# Patient Record
Sex: MALE | Race: WHITE | NOT HISPANIC OR LATINO | Employment: OTHER | URBAN - METROPOLITAN AREA
[De-identification: names, ages, dates, MRNs, and addresses within clinical notes are randomized per-mention and may not be internally consistent; named-entity substitution may affect disease eponyms.]

---

## 2019-01-03 ENCOUNTER — OFFICE VISIT (OUTPATIENT)
Dept: RETAIL CLINIC | Facility: CLINIC | Age: 49
End: 2019-01-03

## 2019-01-03 VITALS
RESPIRATION RATE: 20 BRPM | TEMPERATURE: 99.3 F | HEIGHT: 70 IN | WEIGHT: 315 LBS | OXYGEN SATURATION: 96 % | DIASTOLIC BLOOD PRESSURE: 85 MMHG | BODY MASS INDEX: 45.1 KG/M2 | HEART RATE: 83 BPM | SYSTOLIC BLOOD PRESSURE: 124 MMHG

## 2019-01-03 DIAGNOSIS — J02.0 STREP PHARYNGITIS: Primary | ICD-10-CM

## 2019-01-03 LAB
EXPIRATION DATE: ABNORMAL
INTERNAL CONTROL: ABNORMAL
Lab: ABNORMAL
S PYO AG THROAT QL: POSITIVE

## 2019-01-03 PROCEDURE — 87880 STREP A ASSAY W/OPTIC: CPT | Performed by: NURSE PRACTITIONER

## 2019-01-03 PROCEDURE — 99213 OFFICE O/P EST LOW 20 MIN: CPT | Performed by: NURSE PRACTITIONER

## 2019-01-03 RX ORDER — PREDNISONE 10 MG/1
TABLET ORAL DAILY
Qty: 21 EACH | Refills: 0 | Status: SHIPPED | OUTPATIENT
Start: 2019-01-03 | End: 2019-01-09

## 2019-01-03 RX ORDER — VENLAFAXINE 75 MG/1
75 TABLET ORAL 3 TIMES DAILY
COMMUNITY
End: 2022-07-07

## 2019-01-03 RX ORDER — AMOXICILLIN 875 MG/1
875 TABLET, COATED ORAL 2 TIMES DAILY
Qty: 20 TABLET | Refills: 0 | Status: SHIPPED | OUTPATIENT
Start: 2019-01-03 | End: 2019-01-13

## 2019-01-03 RX ORDER — ECHINACEA PURPUREA EXTRACT 125 MG
1 TABLET ORAL AS NEEDED
Qty: 44 ML | Refills: 1 | Status: SHIPPED | OUTPATIENT
Start: 2019-01-03

## 2019-01-03 RX ORDER — GUAIFENESIN 600 MG/1
1200 TABLET, EXTENDED RELEASE ORAL 2 TIMES DAILY PRN
Qty: 40 TABLET | Refills: 0 | Status: SHIPPED | OUTPATIENT
Start: 2019-01-03 | End: 2019-01-13

## 2019-01-03 NOTE — PROGRESS NOTES
Debbie Reynolds is a 48 y.o. male.     Pt seen today for a sore thtoat for the last 3 days without fever.       Sore Throat    This is a new problem. The current episode started in the past 7 days. The problem has been gradually worsening. Neither side of throat is experiencing more pain than the other. There has been no fever. The pain is moderate. Associated symptoms include congestion, coughing, headaches, a hoarse voice and trouble swallowing. Pertinent negatives include no abdominal pain, diarrhea, drooling, ear discharge, ear pain, plugged ear sensation, neck pain, shortness of breath, stridor, swollen glands or vomiting. He has had no exposure to strep or mono. He has tried NSAIDs for the symptoms. The treatment provided no relief.        Current Outpatient Medications on File Prior to Visit   Medication Sig Dispense Refill   • Coal Tar Extract (T/GEL EXTRA STRENGTH EX) Apply  topically.     • darunavir (PREZISTA) 600 MG tablet Take 600 mg by mouth 2 (Two) Times a Day.     • Rswbfin-Auvjc-Spykqhug-TenofAF (GENVOYA) [phone] MG per tablet 1 tablet.     • venlafaxine (EFFEXOR) 75 MG tablet Take 75 mg by mouth 3 (Three) Times a Day.       No current facility-administered medications on file prior to visit.        Allergies   Allergen Reactions   • Atovaquone Rash   • Meloxicam Rash   • Sulfa Antibiotics Rash   • Tramadol Rash       Past Medical History:   Diagnosis Date   • Anxiety    • COPD (chronic obstructive pulmonary disease) (CMS/[facility])    • Depression    • Gallbladder abscess    • HIV (human immunodeficiency virus infection) (CMS/[facility]) 1997    for 22yrs       Past Surgical History:   Procedure Laterality Date   • CHOLECYSTECTOMY         Family History   Problem Relation Age of Onset   • Cancer Mother    • Heart disease Mother    • Lung disease Mother    • Cancer Father    • Heart disease Father    • Lung disease Father    • Lung disease Sister        Social History     Socioeconomic  "History   • Marital status: Single     Spouse name: Not on file   • Number of children: Not on file   • Years of education: Not on file   • Highest education level: Not on file   Social Needs   • Financial resource strain: Not on file   • Food insecurity - worry: Not on file   • Food insecurity - inability: Not on file   • Transportation needs - medical: Not on file   • Transportation needs - non-medical: Not on file   Occupational History   • Not on file   Tobacco Use   • Smoking status: Former Smoker     Types: Cigarettes     Last attempt to quit: 6/3/2012     Years since quittin.5   Substance and Sexual Activity   • Alcohol use: No     Frequency: Never   • Drug use: No   • Sexual activity: Defer   Other Topics Concern   • Not on file   Social History Narrative   • Not on file       Review of Systems   Constitutional: Positive for appetite change and fatigue. Negative for activity change, chills, diaphoresis and fever.   HENT: Positive for congestion, hoarse voice, sore throat, trouble swallowing and voice change. Negative for drooling, ear discharge and ear pain.    Eyes: Negative for pain, discharge, redness and itching.   Respiratory: Positive for cough and chest tightness. Negative for shortness of breath and stridor.    Gastrointestinal: Negative for abdominal pain, diarrhea, nausea and vomiting.   Musculoskeletal: Negative for arthralgias, myalgias and neck pain.   Skin: Negative for rash.   Allergic/Immunologic: Negative for environmental allergies.   Neurological: Positive for headaches. Negative for dizziness and light-headedness.   Psychiatric/Behavioral: Negative for agitation.       /85   Pulse 83   Temp 99.3 °F (37.4 °C) (Oral)   Resp 20   Ht 177.8 cm (70\")   Wt (!) 151 kg (332 lb)   SpO2 96%   BMI 47.64 kg/m²     Objective   Physical Exam   Constitutional: He is oriented to person, place, and time. He appears well-developed and well-nourished. He is cooperative. He appears ill. "   HENT:   Head: Normocephalic and atraumatic.   Right Ear: Tympanic membrane, external ear and ear canal normal.   Left Ear: Tympanic membrane, external ear and ear canal normal.   Nose: Right sinus exhibits maxillary sinus tenderness and frontal sinus tenderness. Left sinus exhibits no maxillary sinus tenderness and no frontal sinus tenderness.   Mouth/Throat: Uvula is midline and mucous membranes are normal. Posterior oropharyngeal erythema present. No oropharyngeal exudate or posterior oropharyngeal edema. Tonsils are 1+ on the right. Tonsils are 1+ on the left. No tonsillar exudate.   Eyes: Conjunctivae and lids are normal.   Cardiovascular: Normal heart sounds.   Pulmonary/Chest: Effort normal. He has wheezes in the left lower field.   Lymphadenopathy:     He has cervical adenopathy.   Neurological: He is alert and oriented to person, place, and time.   Skin: Skin is warm and dry. No rash noted.   Psychiatric: He has a normal mood and affect. His speech is normal and behavior is normal.       Procedures None    Assessment/Plan   Diagnoses and all orders for this visit:    Strep pharyngitis  -     POC Rapid Strep A    Other orders  -     PredniSONE (DELTASONE) 10 MG (21) tablet pack; Take  by mouth Daily for 6 days. Use as directed on package  -     amoxicillin (AMOXIL) 875 MG tablet; Take 1 tablet by mouth 2 (Two) Times a Day for 10 days.  -     guaiFENesin (MUCINEX) 600 MG 12 hr tablet; Take 2 tablets by mouth 2 (Two) Times a Day As Needed for Cough for up to 10 days.  -     sodium chloride (OCEAN NASAL SPRAY) 0.65 % nasal spray; 1 spray into the nostril(s) as directed by provider As Needed for Congestion.        --Please utilize tylenol or ibuprofen as needed for fever or pain. Use as recommended.   -Use mucinex or equivalent over the counter medicine for congestion.  -- Noted 8-10 eight ounce glasses of fluid a day. Nonalcoholic and noncafeinated beverages  -Pt given a list of PCP's to call and make an  appointment with .     Follow up with PCP or go to the nearest emergency room if symptoms worsen or fail to improve.

## 2020-10-22 ENCOUNTER — LAB (OUTPATIENT)
Dept: LAB | Facility: HOSPITAL | Age: 50
End: 2020-10-22

## 2020-10-22 ENCOUNTER — OFFICE VISIT (OUTPATIENT)
Dept: ENDOCRINOLOGY | Facility: CLINIC | Age: 50
End: 2020-10-22

## 2020-10-22 VITALS
OXYGEN SATURATION: 99 % | DIASTOLIC BLOOD PRESSURE: 82 MMHG | HEIGHT: 71 IN | WEIGHT: 315 LBS | HEART RATE: 85 BPM | TEMPERATURE: 98.4 F | SYSTOLIC BLOOD PRESSURE: 136 MMHG | BODY MASS INDEX: 44.1 KG/M2

## 2020-10-22 DIAGNOSIS — E29.1 PRIMARY MALE HYPOGONADISM: Primary | ICD-10-CM

## 2020-10-22 DIAGNOSIS — E29.1 PRIMARY MALE HYPOGONADISM: ICD-10-CM

## 2020-10-22 LAB
BASOPHILS # BLD AUTO: 0.05 10*3/MM3 (ref 0–0.2)
BASOPHILS NFR BLD AUTO: 0.6 % (ref 0–1.5)
DEPRECATED RDW RBC AUTO: 42.3 FL (ref 37–54)
EOSINOPHIL # BLD AUTO: 0.1 10*3/MM3 (ref 0–0.4)
EOSINOPHIL NFR BLD AUTO: 1.2 % (ref 0.3–6.2)
ERYTHROCYTE [DISTWIDTH] IN BLOOD BY AUTOMATED COUNT: 14.1 % (ref 12.3–15.4)
HCT VFR BLD AUTO: 46.3 % (ref 37.5–51)
HGB BLD-MCNC: 16 G/DL (ref 13–17.7)
IMM GRANULOCYTES # BLD AUTO: 0.11 10*3/MM3 (ref 0–0.05)
IMM GRANULOCYTES NFR BLD AUTO: 1.3 % (ref 0–0.5)
LYMPHOCYTES # BLD AUTO: 1.97 10*3/MM3 (ref 0.7–3.1)
LYMPHOCYTES NFR BLD AUTO: 24.1 % (ref 19.6–45.3)
MCH RBC QN AUTO: 29 PG (ref 26.6–33)
MCHC RBC AUTO-ENTMCNC: 34.6 G/DL (ref 31.5–35.7)
MCV RBC AUTO: 83.9 FL (ref 79–97)
MONOCYTES # BLD AUTO: 0.62 10*3/MM3 (ref 0.1–0.9)
MONOCYTES NFR BLD AUTO: 7.6 % (ref 5–12)
NEUTROPHILS NFR BLD AUTO: 5.33 10*3/MM3 (ref 1.7–7)
NEUTROPHILS NFR BLD AUTO: 65.2 % (ref 42.7–76)
NRBC BLD AUTO-RTO: 0 /100 WBC (ref 0–0.2)
PLATELET # BLD AUTO: 163 10*3/MM3 (ref 140–450)
PMV BLD AUTO: 11.1 FL (ref 6–12)
RBC # BLD AUTO: 5.52 10*6/MM3 (ref 4.14–5.8)
TESTOST SERPL-MCNC: 874 NG/DL (ref 249–836)
WBC # BLD AUTO: 8.18 10*3/MM3 (ref 3.4–10.8)

## 2020-10-22 PROCEDURE — 84403 ASSAY OF TOTAL TESTOSTERONE: CPT | Performed by: INTERNAL MEDICINE

## 2020-10-22 PROCEDURE — 36415 COLL VENOUS BLD VENIPUNCTURE: CPT

## 2020-10-22 PROCEDURE — 99213 OFFICE O/P EST LOW 20 MIN: CPT | Performed by: INTERNAL MEDICINE

## 2020-10-22 PROCEDURE — 85025 COMPLETE CBC W/AUTO DIFF WBC: CPT | Performed by: INTERNAL MEDICINE

## 2020-10-22 RX ORDER — ALBUTEROL SULFATE 90 UG/1
AEROSOL, METERED RESPIRATORY (INHALATION)
COMMUNITY
Start: 2020-09-27

## 2020-10-22 RX ORDER — PREGABALIN 150 MG/1
CAPSULE ORAL
COMMUNITY
Start: 2020-10-04

## 2020-10-22 RX ORDER — BUPRENORPHINE HYDROCHLORIDE AND NALOXONE HYDROCHLORIDE DIHYDRATE 8; 2 MG/1; MG/1
1 TABLET SUBLINGUAL DAILY
COMMUNITY

## 2020-10-22 RX ORDER — DESVENLAFAXINE 100 MG/1
TABLET, EXTENDED RELEASE ORAL
COMMUNITY
Start: 2020-09-17

## 2020-10-22 RX ORDER — TRETINOIN 1 MG/G
CREAM TOPICAL
COMMUNITY
Start: 2020-09-27

## 2020-10-22 RX ORDER — TESTOSTERONE CYPIONATE 200 MG/ML
1 INJECTION, SOLUTION INTRAMUSCULAR
COMMUNITY
Start: 2020-09-27 | End: 2020-12-31 | Stop reason: SDUPTHER

## 2020-10-22 RX ORDER — BETAMETHASONE DIPROPIONATE 0.5 MG/G
CREAM TOPICAL
COMMUNITY
Start: 2020-09-27

## 2020-10-22 RX ORDER — LAMOTRIGINE 200 MG/1
TABLET ORAL
COMMUNITY
Start: 2020-09-17

## 2020-10-22 NOTE — PROGRESS NOTES
"     Office Note      Date: 10/22/2020  Patient Name: Jimbo Reynolds  MRN: [id number]  : 1970    Chief Complaint   Patient presents with   • Follow-up   • Hypogonadism       History of Present Illness:   Jimbo Reynolds is a 50 y.o. male who presents for Follow-up and Hypogonadism  He has primary testes failure with high fsh and lh.   Context: hiv + and hx of drug addiction  Modifying factors- testosterone replacement therapy  Current symptoms: libido is good. Body hair is better, energy level is better. Body fat is better. Acne is worse.  Associated disorders: no issues with high blood pressure or erythrocytosis. No issues wih prostat.    Subjective          Review of Systems:   Review of Systems   Constitutional: Negative.    Eyes: Negative.    Endocrine: Negative.    Allergic/Immunologic: Positive for immunocompromised state. Negative for environmental allergies.   Neurological: Negative.        The following portions of the patient's history were reviewed and updated as appropriate: allergies, current medications, past family history, past medical history, past social history, past surgical history and problem list.    Objective     Visit Vitals  /82 (BP Location: Left arm, Patient Position: Sitting, Cuff Size: Large Adult)   Pulse 85   Temp 98.4 °F (36.9 °C)   Ht 180.3 cm (71\")   Wt (!) 149 kg (328 lb 6.4 oz)   SpO2 99%   BMI 45.80 kg/m²       Labs:    CBC w/DIFF  Lab Results   Component Value Date    WBC 8.18 10/22/2020    RBC 5.52 10/22/2020    HGB 16.0 10/22/2020    HCT 46.3 10/22/2020    MCV 83.9 10/22/2020    MCH 29.0 10/22/2020    MCHC 34.6 10/22/2020    RDW 14.1 10/22/2020    RDWSD 42.3 10/22/2020    MPV 11.1 10/22/2020     10/22/2020    NEUTRORELPCT 65.2 10/22/2020    LYMPHORELPCT 24.1 10/22/2020    MONORELPCT 7.6 10/22/2020    EOSRELPCT 1.2 10/22/2020    BASORELPCT 0.6 10/22/2020    AUTOIGPER 1.3 (H) 10/22/2020    NEUTROABS 5.33 10/22/2020    LYMPHSABS 1.97 10/22/2020    MONOSABS " "0.62 10/22/2020    EOSABS 0.10 10/22/2020    BASOSABS 0.05 10/22/2020    AUTOIGNUM 0.11 (H) 10/22/2020    NRBC 0.0 10/22/2020       T4  No results found for: FREET4    TSH  No results found for: TSHBASE     Physical Exam:  Physical Exam  Constitutional:       Appearance: Normal appearance. He is obese.   HENT:      Head: Normocephalic and atraumatic.   Cardiovascular:      Rate and Rhythm: Normal rate and regular rhythm.   Pulmonary:      Effort: Pulmonary effort is normal.      Breath sounds: Normal breath sounds.   Musculoskeletal: Normal range of motion.   Skin:     General: Skin is warm and dry.   Neurological:      General: No focal deficit present.      Mental Status: He is alert and oriented to person, place, and time.   Psychiatric:         Mood and Affect: Mood normal.         Thought Content: Thought content normal.         Judgment: Judgment normal.         Assessment / Plan      Assessment & Plan:  Problem List Items Addressed This Visit        Other    Primary male hypogonadism - Primary    Current Assessment & Plan     Clinically he is doing well.   Testosterone is a high risk  parenteral controlled substance that requires monitoring of levels and cbc.           Relevant Medications    Testosterone Cypionate (DEPOTESTOTERONE CYPIONATE) 200 MG/ML injection    Insulin Syringe-Needle U-100 (B-D INSULIN SYRINGE 1CC/25GX1\") 25G X 1\" 1 ML misc    Other Relevant Orders    Testosterone (Completed)    CBC & Differential (Completed)           Brooks Joseph MD   10/22/2020  "

## 2020-10-22 NOTE — ASSESSMENT & PLAN NOTE
Clinically he is doing well.   Testosterone is a high risk  parenteral controlled substance that requires monitoring of levels and cbc.

## 2020-12-31 ENCOUNTER — TELEPHONE (OUTPATIENT)
Dept: ENDOCRINOLOGY | Facility: CLINIC | Age: 50
End: 2020-12-31

## 2020-12-31 DIAGNOSIS — E29.1 PRIMARY MALE HYPOGONADISM: Primary | ICD-10-CM

## 2020-12-31 RX ORDER — SYRINGE AND NEEDLE,INSULIN,1ML 25GX1"
SYRINGE, EMPTY DISPOSABLE MISCELLANEOUS
Qty: 10 EACH | Refills: 3 | Status: SHIPPED | OUTPATIENT
Start: 2020-12-31 | End: 2022-07-18

## 2020-12-31 RX ORDER — TESTOSTERONE CYPIONATE 200 MG/ML
200 INJECTION, SOLUTION INTRAMUSCULAR
Qty: 10 ML | Refills: 1 | Status: SHIPPED | OUTPATIENT
Start: 2020-12-31 | End: 2021-06-15

## 2020-12-31 NOTE — TELEPHONE ENCOUNTER
PT CALLED STATING THAT HE NEEDS TESTOSTERONE AND INJECTION NEEDLES SENT IN TO [facility] [address].

## 2021-04-22 ENCOUNTER — LAB (OUTPATIENT)
Dept: LAB | Facility: HOSPITAL | Age: 51
End: 2021-04-22

## 2021-04-22 ENCOUNTER — OFFICE VISIT (OUTPATIENT)
Dept: ENDOCRINOLOGY | Facility: CLINIC | Age: 51
End: 2021-04-22

## 2021-04-22 VITALS
SYSTOLIC BLOOD PRESSURE: 124 MMHG | BODY MASS INDEX: 43.68 KG/M2 | DIASTOLIC BLOOD PRESSURE: 80 MMHG | OXYGEN SATURATION: 97 % | TEMPERATURE: 97.3 F | WEIGHT: 312 LBS | HEIGHT: 71 IN | HEART RATE: 94 BPM

## 2021-04-22 DIAGNOSIS — E29.1 PRIMARY MALE HYPOGONADISM: Primary | ICD-10-CM

## 2021-04-22 DIAGNOSIS — E29.1 PRIMARY MALE HYPOGONADISM: ICD-10-CM

## 2021-04-22 LAB
BASOPHILS # BLD AUTO: 0.04 10*3/MM3 (ref 0–0.2)
BASOPHILS NFR BLD AUTO: 0.6 % (ref 0–1.5)
DEPRECATED RDW RBC AUTO: 40.6 FL (ref 37–54)
EOSINOPHIL # BLD AUTO: 0.09 10*3/MM3 (ref 0–0.4)
EOSINOPHIL NFR BLD AUTO: 1.3 % (ref 0.3–6.2)
ERYTHROCYTE [DISTWIDTH] IN BLOOD BY AUTOMATED COUNT: 13.6 % (ref 12.3–15.4)
HCT VFR BLD AUTO: 48.9 % (ref 37.5–51)
HGB BLD-MCNC: 17.1 G/DL (ref 13–17.7)
IMM GRANULOCYTES # BLD AUTO: 0.09 10*3/MM3 (ref 0–0.05)
IMM GRANULOCYTES NFR BLD AUTO: 1.3 % (ref 0–0.5)
LYMPHOCYTES # BLD AUTO: 1.69 10*3/MM3 (ref 0.7–3.1)
LYMPHOCYTES NFR BLD AUTO: 24.4 % (ref 19.6–45.3)
MCH RBC QN AUTO: 29.1 PG (ref 26.6–33)
MCHC RBC AUTO-ENTMCNC: 35 G/DL (ref 31.5–35.7)
MCV RBC AUTO: 83.2 FL (ref 79–97)
MONOCYTES # BLD AUTO: 0.65 10*3/MM3 (ref 0.1–0.9)
MONOCYTES NFR BLD AUTO: 9.4 % (ref 5–12)
NEUTROPHILS NFR BLD AUTO: 4.36 10*3/MM3 (ref 1.7–7)
NEUTROPHILS NFR BLD AUTO: 63 % (ref 42.7–76)
NRBC BLD AUTO-RTO: 0 /100 WBC (ref 0–0.2)
PLATELET # BLD AUTO: 200 10*3/MM3 (ref 140–450)
PMV BLD AUTO: 11 FL (ref 6–12)
RBC # BLD AUTO: 5.88 10*6/MM3 (ref 4.14–5.8)
TESTOST SERPL-MCNC: 1022 NG/DL (ref 249–836)
WBC # BLD AUTO: 6.92 10*3/MM3 (ref 3.4–10.8)

## 2021-04-22 PROCEDURE — 36415 COLL VENOUS BLD VENIPUNCTURE: CPT

## 2021-04-22 PROCEDURE — 99213 OFFICE O/P EST LOW 20 MIN: CPT | Performed by: INTERNAL MEDICINE

## 2021-04-22 PROCEDURE — 85025 COMPLETE CBC W/AUTO DIFF WBC: CPT

## 2021-04-22 PROCEDURE — 84403 ASSAY OF TOTAL TESTOSTERONE: CPT

## 2021-04-22 RX ORDER — BUPRENORPHINE HYDROCHLORIDE AND NALOXONE HYDROCHLORIDE DIHYDRATE 8; 2 MG/1; MG/1
1 TABLET SUBLINGUAL 2 TIMES DAILY
COMMUNITY
End: 2022-07-07 | Stop reason: SDUPTHER

## 2021-04-22 RX ORDER — CYCLOSPORINE 0.5 MG/ML
EMULSION OPHTHALMIC
COMMUNITY
Start: 2021-03-08

## 2021-04-22 RX ORDER — PRAZOSIN HYDROCHLORIDE 2 MG/1
CAPSULE ORAL
COMMUNITY
Start: 2021-04-06 | End: 2022-07-07

## 2021-04-22 RX ORDER — LISINOPRIL 10 MG/1
TABLET ORAL
COMMUNITY
Start: 2021-02-14

## 2021-04-22 NOTE — PROGRESS NOTES
"     Office Note      Date: 2021  Patient Name: Jimbo Reynolds  MRN: [id number]  : 1970    Chief Complaint   Patient presents with   • Hypogonadism       History of Present Illness:   Jimbo Reynolds is a 50 y.o. male who presents for Hypogonadism  he has primary testes failure with high lh and fsh and low T  He is on replacement with injections and is here for routine 6 months follow up.   Last shot was yesterday.  He can tell when he is due for a shot. There is a decline in energy    He has a torn meniscus and they are considering surgery/mri.  He has lost weight.  bp has been high and treated with lisinopril  Acne is manageable.  No trouble urinating.  No dvts    He is pleased with how he feelt    Subjective        Review of Systems:   Review of Systems   Musculoskeletal: Positive for arthralgias.       The following portions of the patient's history were reviewed and updated as appropriate: allergies, current medications, past family history, past medical history, past social history, past surgical history and problem list.    Objective     Visit Vitals  /80 (BP Location: Left arm, Patient Position: Sitting, Cuff Size: Adult)   Pulse 94   Temp 97.3 °F (36.3 °C) (Infrared)   Ht 180.3 cm (71\")   Wt (!) 142 kg (312 lb)   SpO2 97%   BMI 43.52 kg/m²       Labs:    CBC w/DIFF  Lab Results   Component Value Date    WBC 8.18 10/22/2020    RBC 5.52 10/22/2020    HGB 16.0 10/22/2020    HCT 46.3 10/22/2020    MCV 83.9 10/22/2020    MCH 29.0 10/22/2020    MCHC 34.6 10/22/2020    RDW 14.1 10/22/2020    RDWSD 42.3 10/22/2020    MPV 11.1 10/22/2020     10/22/2020    NEUTRORELPCT 65.2 10/22/2020    LYMPHORELPCT 24.1 10/22/2020    MONORELPCT 7.6 10/22/2020    EOSRELPCT 1.2 10/22/2020    BASORELPCT 0.6 10/22/2020    AUTOIGPER 1.3 (H) 10/22/2020    NEUTROABS 5.33 10/22/2020    LYMPHSABS 1.97 10/22/2020    MONOSABS 0.62 10/22/2020    EOSABS 0.10 10/22/2020    BASOSABS 0.05 10/22/2020    AUTOIGNUM 0.11 " "(H) 10/22/2020    NRBC 0.0 10/22/2020       T4  No results found for: FREET4    TSH  No results found for: TSHBASE     Physical Exam:  Physical Exam  Vitals reviewed.   Neurological:      Mental Status: He is oriented to person, place, and time.   Psychiatric:         Mood and Affect: Mood normal.         Thought Content: Thought content normal.         Judgment: Judgment normal.         Assessment / Plan      Assessment & Plan:  Problem List Items Addressed This Visit        Other    Primary male hypogonadism - Primary    Current Assessment & Plan     Clinically well. Will check levels          Relevant Medications    Testosterone Cypionate (DEPOTESTOTERONE CYPIONATE) 200 MG/ML injection    Insulin Syringe-Needle U-100 (B-D INSULIN SYRINGE 1CC/25GX1\") 25G X 1\" 1 ML misc    Other Relevant Orders    CBC & Differential    Testosterone           Brooks Joseph MD   04/22/2021  "

## 2021-06-15 DIAGNOSIS — E29.1 PRIMARY MALE HYPOGONADISM: ICD-10-CM

## 2021-06-16 RX ORDER — TESTOSTERONE CYPIONATE 200 MG/ML
INJECTION, SOLUTION INTRAMUSCULAR
Qty: 3 ML | Refills: 5 | Status: SHIPPED | OUTPATIENT
Start: 2021-06-16 | End: 2021-12-20

## 2021-06-17 DIAGNOSIS — E29.1 PRIMARY MALE HYPOGONADISM: ICD-10-CM

## 2021-06-22 RX ORDER — TESTOSTERONE CYPIONATE 200 MG/ML
INJECTION, SOLUTION INTRAMUSCULAR
Refills: 0 | OUTPATIENT
Start: 2021-06-22

## 2021-07-12 RX ORDER — SYRINGE WITH NEEDLE, 1 ML 25GX5/8"
SYRINGE, EMPTY DISPOSABLE MISCELLANEOUS
Qty: 9 EACH | Refills: 1 | Status: SHIPPED | OUTPATIENT
Start: 2021-07-12 | End: 2021-12-20

## 2021-10-11 ENCOUNTER — TELEPHONE (OUTPATIENT)
Dept: ENDOCRINOLOGY | Facility: CLINIC | Age: 51
End: 2021-10-11

## 2021-10-11 NOTE — TELEPHONE ENCOUNTER
Pt. Needs to reschedule 10/28 @ 0745 appt, but needs to be seen close to that time to keep his refills for Testosterone in order. The calendar pushed me to 1/21, t. States he would like to be seen sooner than that.

## 2021-12-09 ENCOUNTER — TELEPHONE (OUTPATIENT)
Dept: ENDOCRINOLOGY | Facility: CLINIC | Age: 51
End: 2021-12-09

## 2021-12-09 NOTE — TELEPHONE ENCOUNTER
PATIENT IS STUCK IN [place] AND CAN'T GET IN FOR AN APPOINTMENT. NEXT AVAILABLE WAS OFFERED BUT PATIENT STATES THAT HE CAN'T WAIT THAT LONG FOR AN APPOINTMENT. HE STATES THAT HE IS OFF THE WEEK OF CHRISTMAS AND WILL BE BACK IN TOWN IF DR THOMPSON CAN WORK HIM IN.    CALL BACK [phone]

## 2021-12-15 NOTE — TELEPHONE ENCOUNTER
LVM for patient to call the office. We do have a few openings with Dr. Joseph next week available right now.

## 2021-12-20 DIAGNOSIS — E29.1 PRIMARY MALE HYPOGONADISM: ICD-10-CM

## 2021-12-20 RX ORDER — SYRINGE WITH NEEDLE, 1 ML 25GX5/8"
SYRINGE, EMPTY DISPOSABLE MISCELLANEOUS
Qty: 6 EACH | Refills: 5 | Status: SHIPPED | OUTPATIENT
Start: 2021-12-20 | End: 2022-07-18

## 2021-12-20 RX ORDER — TESTOSTERONE CYPIONATE 200 MG/ML
INJECTION, SOLUTION INTRAMUSCULAR
Qty: 3 ML | Refills: 5 | Status: SHIPPED | OUTPATIENT
Start: 2021-12-20 | End: 2022-06-13

## 2022-06-12 DIAGNOSIS — E29.1 PRIMARY MALE HYPOGONADISM: ICD-10-CM

## 2022-06-13 RX ORDER — TESTOSTERONE CYPIONATE 200 MG/ML
INJECTION, SOLUTION INTRAMUSCULAR
Qty: 3 ML | Refills: 0 | Status: SHIPPED | OUTPATIENT
Start: 2022-06-13 | End: 2022-07-18 | Stop reason: SDUPTHER

## 2022-07-07 ENCOUNTER — LAB (OUTPATIENT)
Dept: LAB | Facility: HOSPITAL | Age: 52
End: 2022-07-07

## 2022-07-07 ENCOUNTER — OFFICE VISIT (OUTPATIENT)
Dept: ENDOCRINOLOGY | Facility: CLINIC | Age: 52
End: 2022-07-07

## 2022-07-07 VITALS
HEART RATE: 83 BPM | BODY MASS INDEX: 44.52 KG/M2 | OXYGEN SATURATION: 96 % | WEIGHT: 311 LBS | DIASTOLIC BLOOD PRESSURE: 90 MMHG | SYSTOLIC BLOOD PRESSURE: 148 MMHG | HEIGHT: 70 IN

## 2022-07-07 DIAGNOSIS — E29.1 PRIMARY MALE HYPOGONADISM: Primary | ICD-10-CM

## 2022-07-07 DIAGNOSIS — E29.1 PRIMARY MALE HYPOGONADISM: ICD-10-CM

## 2022-07-07 LAB
BASOPHILS # BLD AUTO: 0.04 10*3/MM3 (ref 0–0.2)
BASOPHILS NFR BLD AUTO: 0.6 % (ref 0–1.5)
DEPRECATED RDW RBC AUTO: 39.5 FL (ref 37–54)
EOSINOPHIL # BLD AUTO: 0.07 10*3/MM3 (ref 0–0.4)
EOSINOPHIL NFR BLD AUTO: 1.1 % (ref 0.3–6.2)
ERYTHROCYTE [DISTWIDTH] IN BLOOD BY AUTOMATED COUNT: 13.6 % (ref 12.3–15.4)
HCT VFR BLD AUTO: 45.4 % (ref 37.5–51)
HGB BLD-MCNC: 16.2 G/DL (ref 13–17.7)
IMM GRANULOCYTES # BLD AUTO: 0.2 10*3/MM3 (ref 0–0.05)
IMM GRANULOCYTES NFR BLD AUTO: 3.2 % (ref 0–0.5)
LYMPHOCYTES # BLD AUTO: 1.39 10*3/MM3 (ref 0.7–3.1)
LYMPHOCYTES NFR BLD AUTO: 22 % (ref 19.6–45.3)
MCH RBC QN AUTO: 29.4 PG (ref 26.6–33)
MCHC RBC AUTO-ENTMCNC: 35.7 G/DL (ref 31.5–35.7)
MCV RBC AUTO: 82.4 FL (ref 79–97)
MONOCYTES # BLD AUTO: 0.54 10*3/MM3 (ref 0.1–0.9)
MONOCYTES NFR BLD AUTO: 8.5 % (ref 5–12)
NEUTROPHILS NFR BLD AUTO: 4.08 10*3/MM3 (ref 1.7–7)
NEUTROPHILS NFR BLD AUTO: 64.6 % (ref 42.7–76)
NRBC BLD AUTO-RTO: 0 /100 WBC (ref 0–0.2)
PLATELET # BLD AUTO: 164 10*3/MM3 (ref 140–450)
PMV BLD AUTO: 10.9 FL (ref 6–12)
RBC # BLD AUTO: 5.51 10*6/MM3 (ref 4.14–5.8)
WBC NRBC COR # BLD: 6.32 10*3/MM3 (ref 3.4–10.8)

## 2022-07-07 PROCEDURE — 84402 ASSAY OF FREE TESTOSTERONE: CPT

## 2022-07-07 PROCEDURE — 85025 COMPLETE CBC W/AUTO DIFF WBC: CPT

## 2022-07-07 PROCEDURE — 99213 OFFICE O/P EST LOW 20 MIN: CPT | Performed by: INTERNAL MEDICINE

## 2022-07-07 PROCEDURE — 36415 COLL VENOUS BLD VENIPUNCTURE: CPT

## 2022-07-07 PROCEDURE — 84403 ASSAY OF TOTAL TESTOSTERONE: CPT

## 2022-07-07 NOTE — ASSESSMENT & PLAN NOTE
Clinically stable with minimal side effects.  Will check testo level today and adjust dose and timing of meds based upon results  Annual follow up will suffice     Will check cb to look for increased rbc

## 2022-07-07 NOTE — PROGRESS NOTES
"     Office Note      Date: 2022  Patient Name: Jimbo Reynolds  MRN: [id number]  : 1970    Chief Complaint   Patient presents with   • Hypogonadism       History of Present Illness:   Jimbo Reynolds is a 51 y.o. male who presents for Hypogonadism  he is here for slightly overdue annual visit for primary testes failure  He is on testosterone shots every 10 days   --------------  Last shot was 9 days ago  --------------  He had covid and was sick for 5 days but there has otherwise been no change to his medical history.  --------------    He has managable acne  bp has been a little high and his pcp has been adjusting lisinpril  Testosterone has been helping with his energy level.  Libido been reasonable.  No issue with sexual function  ---  Subjective        Review of Systems:   Review of Systems   Constitutional: Negative for fatigue and unexpected weight change.   Psychiatric/Behavioral: Negative for sleep disturbance.       The following portions of the patient's history were reviewed and updated as appropriate: allergies, current medications, past family history, past medical history, past social history, past surgical history and problem list.    Objective     Visit Vitals  /90 (BP Location: Left arm, Patient Position: Sitting, Cuff Size: Adult)   Pulse 83   Ht 177.8 cm (70\")   Wt (!) 141 kg (311 lb)   SpO2 96%   BMI 44.62 kg/m²       Labs:    CBC w/DIFF  Lab Results   Component Value Date    WBC 6.92 2021    RBC 5.88 (H) 2021    HGB 17.1 2021    HCT 48.9 2021    MCV 83.2 2021    MCH 29.1 2021    MCHC 35.0 2021    RDW 13.6 2021    RDWSD 40.6 2021    MPV 11.0 2021     2021    NEUTRORELPCT 63.0 2021    LYMPHORELPCT 24.4 2021    MONORELPCT 9.4 2021    EOSRELPCT 1.3 2021    BASORELPCT 0.6 2021    AUTOIGPER 1.3 (H) 2021    NEUTROABS 4.36 2021    LYMPHSABS 1.69 2021    MONOSABS " "0.65 04/22/2021    EOSABS 0.09 04/22/2021    BASOSABS 0.04 04/22/2021    AUTOIGNUM 0.09 (H) 04/22/2021    NRBC 0.0 04/22/2021       T4  No results found for: FREET4    TSH  No results found for: TSHBASE     Physical Exam:  Physical Exam  Vitals reviewed.   Constitutional:       Appearance: Normal appearance.   Neurological:      Mental Status: He is alert.   Psychiatric:         Mood and Affect: Mood normal.         Behavior: Behavior normal.         Thought Content: Thought content normal.         Judgment: Judgment normal.         Assessment / Plan      Assessment & Plan:  Problem List Items Addressed This Visit        Other    Primary male hypogonadism - Primary    Overview     Had high lh and fsh with low T at time of diagnosis .           Current Assessment & Plan     Clinically stable with minimal side effects.  Will check testo level today and adjust dose and timing of meds based upon results  Annual follow up will suffice     Will check cb to look for increased rbc            Relevant Medications    Insulin Syringe-Needle U-100 (B-D INSULIN SYRINGE 1CC/25GX1\") 25G X 1\" 1 ML misc    Testosterone Cypionate (DEPOTESTOTERONE CYPIONATE) 200 MG/ML injection    Other Relevant Orders    Testosterone, Free, Total    CBC & Differential           Brooks Joseph MD   07/07/2022  "

## 2022-07-09 LAB
TESTOST FREE SERPL-MCNC: 2.9 PG/ML (ref 6.8–21.5)
TESTOST SERPL-MCNC: 328 NG/DL (ref 264–916)

## 2022-07-15 RX ORDER — SYRINGE WITH NEEDLE, 1 ML 25GX5/8"
SYRINGE, EMPTY DISPOSABLE MISCELLANEOUS
Qty: 9 EACH | Refills: 0 | Status: SHIPPED | OUTPATIENT
Start: 2022-07-15 | End: 2022-07-18

## 2022-07-18 ENCOUNTER — PATIENT MESSAGE (OUTPATIENT)
Dept: ENDOCRINOLOGY | Facility: CLINIC | Age: 52
End: 2022-07-18

## 2022-07-18 DIAGNOSIS — E29.1 PRIMARY MALE HYPOGONADISM: ICD-10-CM

## 2022-07-18 RX ORDER — TESTOSTERONE CYPIONATE 200 MG/ML
INJECTION, SOLUTION INTRAMUSCULAR
Qty: 3 ML | Refills: 5 | Status: SHIPPED | OUTPATIENT
Start: 2022-07-18 | End: 2023-01-03

## 2022-07-18 RX ORDER — SYRINGE WITH NEEDLE, 1 ML 25GX5/8"
SYRINGE, EMPTY DISPOSABLE MISCELLANEOUS
Qty: 10 EACH | Refills: 3 | Status: SHIPPED | OUTPATIENT
Start: 2022-07-18

## 2022-07-18 RX ORDER — NEEDLES, DISPOSABLE 25GX5/8"
NEEDLE, DISPOSABLE MISCELLANEOUS
Qty: 10 EACH | Refills: 3 | Status: SHIPPED | OUTPATIENT
Start: 2022-07-18

## 2022-07-19 ENCOUNTER — TELEPHONE (OUTPATIENT)
Dept: ENDOCRINOLOGY | Facility: CLINIC | Age: 52
End: 2022-07-19

## 2022-11-29 RX ORDER — SYRINGE WITH NEEDLE, 1 ML 25GX5/8"
SYRINGE, EMPTY DISPOSABLE MISCELLANEOUS
Qty: 9 EACH | Refills: 1 | Status: SHIPPED | OUTPATIENT
Start: 2022-11-29

## 2022-11-29 RX ORDER — SYRINGE WITH NEEDLE, 1 ML 25GX5/8"
SYRINGE, EMPTY DISPOSABLE MISCELLANEOUS
Qty: 9 EACH | Refills: 1 | Status: SHIPPED | OUTPATIENT
Start: 2022-11-29 | End: 2022-11-29

## 2023-01-02 DIAGNOSIS — E29.1 PRIMARY MALE HYPOGONADISM: ICD-10-CM

## 2023-01-03 RX ORDER — TESTOSTERONE CYPIONATE 200 MG/ML
INJECTION, SOLUTION INTRAMUSCULAR
Qty: 3 ML | Refills: 3 | Status: SHIPPED | OUTPATIENT
Start: 2023-01-03

## 2023-04-25 DIAGNOSIS — E29.1 PRIMARY MALE HYPOGONADISM: ICD-10-CM

## 2023-04-25 NOTE — TELEPHONE ENCOUNTER
Rx Refill Note  Requested Prescriptions     Pending Prescriptions Disp Refills   • Testosterone Cypionate (DEPOTESTOTERONE CYPIONATE) 200 MG/ML injection [Pharmacy Med Name: TESTOSTERONE  MG/ML] 3 mL      Sig: INJECT ONE MILLILITER EVERY 10 DAYS      Last office visit with prescribing clinician: 7/7/2022     Next office visit with prescribing clinician: 7/7/2023       JAEL ESCUDERO

## 2023-04-26 RX ORDER — TESTOSTERONE CYPIONATE 200 MG/ML
INJECTION, SOLUTION INTRAMUSCULAR
Qty: 3 ML | Refills: 2 | Status: SHIPPED | OUTPATIENT
Start: 2023-04-26

## 2023-07-23 DIAGNOSIS — E29.1 PRIMARY MALE HYPOGONADISM: ICD-10-CM

## 2023-07-24 RX ORDER — TESTOSTERONE CYPIONATE 200 MG/ML
INJECTION, SOLUTION INTRAMUSCULAR
Qty: 3 ML | Refills: 5 | Status: SHIPPED | OUTPATIENT
Start: 2023-07-24

## 2023-09-12 ENCOUNTER — OFFICE VISIT (OUTPATIENT)
Dept: ENDOCRINOLOGY | Facility: CLINIC | Age: 53
End: 2023-09-12
Payer: COMMERCIAL

## 2023-09-12 VITALS
BODY MASS INDEX: 36.79 KG/M2 | HEART RATE: 83 BPM | SYSTOLIC BLOOD PRESSURE: 130 MMHG | HEIGHT: 70 IN | WEIGHT: 257 LBS | DIASTOLIC BLOOD PRESSURE: 84 MMHG | OXYGEN SATURATION: 98 %

## 2023-09-12 DIAGNOSIS — E29.1 PRIMARY MALE HYPOGONADISM: Primary | ICD-10-CM

## 2023-09-12 LAB — TESTOST SERPL-MCNC: 569 NG/DL (ref 193–740)

## 2023-09-12 PROCEDURE — 99213 OFFICE O/P EST LOW 20 MIN: CPT | Performed by: INTERNAL MEDICINE

## 2023-09-12 PROCEDURE — 36415 COLL VENOUS BLD VENIPUNCTURE: CPT | Performed by: INTERNAL MEDICINE

## 2023-09-12 PROCEDURE — 84403 ASSAY OF TOTAL TESTOSTERONE: CPT | Performed by: INTERNAL MEDICINE

## 2023-09-12 RX ORDER — TESTOSTERONE ENANTHATE 75 MG/.5ML
75 INJECTION SUBCUTANEOUS WEEKLY
Qty: 2 ML | Refills: 5 | Status: SHIPPED | OUTPATIENT
Start: 2023-09-12

## 2023-09-12 NOTE — PROGRESS NOTES
"     Office Note      Date: 2023  Patient Name: Jimbo Reynolds  MRN: [id number]  : 1970    Chief Complaint   Patient presents with    Hypogonadism       History of Present Illness:   Jimbo Reynolds is a 53 y.o. male who presents for Hypogonadism  .   Current rx: testosterone replacement     Changes in history:none- here for annual follow up   Questions /problems:none. Feels things  are going well.     Acne is an issue     Subjective          Review of Systems:   Review of Systems   Constitutional: Negative.    HENT: Negative.       The following portions of the patient's history were reviewed and updated as appropriate: allergies, current medications, past family history, past medical history, past social history, past surgical history, and problem list.    Objective     Visit Vitals  /84   Pulse 83   Ht 177.8 cm (70\")   Wt 117 kg (257 lb)   SpO2 98%   BMI 36.88 kg/m²           Physical Exam:  Physical Exam  Vitals reviewed.   Constitutional:       Appearance: Normal appearance.   Neurological:      Mental Status: He is alert.   Psychiatric:         Mood and Affect: Mood normal.         Thought Content: Thought content normal.         Judgment: Judgment normal.       Assessment / Plan      Assessment & Plan:  Problem List Items Addressed This Visit          Other    Primary male hypogonadism - Primary    Overview     Had high lh and fsh with low T at time of diagnosis .         Current Assessment & Plan     Clinically stable.  Needs refills and  repeat labs          Relevant Medications    Testosterone Enanthate (Xyosted) 75 MG/0.5ML solution auto-injector    Other Relevant Orders    Testosterone        Brooks Joseph MD   2023  "

## 2023-09-26 ENCOUNTER — PRIOR AUTHORIZATION (OUTPATIENT)
Dept: ENDOCRINOLOGY | Facility: CLINIC | Age: 53
End: 2023-09-26
Payer: COMMERCIAL

## 2023-10-02 NOTE — TELEPHONE ENCOUNTER
I have reviewed the request to cover Xyosted 75mg/0.5ml AUTO INJCT. The information submitted   did not meet the criteria necessary to approve this medication.  Based on the information provided, I am unable to approve coverage for this medication because:  There is no indication your patient has had at least one pre-treatment serum testosterone (total or   free*) measurement, taken in the early morning, which was low, as defined by the normal   laboratory reference values [*Note: Free testosterone levels are to be measured by equilibrium   dialysis assay].  There is nothing to support that the individual has had failure, contraindication, or intolerance to 3   of the following covered alternatives: Androgel 1% or its generic (2.5 gm, 5 gm gel packets),   Androgel 1.62% or its generic (1.25 gm, 2.5 gm gel packet or pump), Testosterone 30 mg/1.5 ml   (2%) solution (generic for Axiron), Testosterone 10 mg/0.5 gm (2%) gel pump (generic for   Fortesta), Testosterone 50 mg/5 gm tube (1%) gel (generic for Testim), Testosterone 50 mg/5 gm   gel packet or tube or 12.5 gm/actuation pump (generic for Vogelxo), Testosterone cypionate   injection (Depo-testosterone) or testosterone enanthate injection (Delatestryl), Androderm   transdermal system (2 mg/24 hr, 4 mg/24 hr patches)

## 2023-10-02 NOTE — TELEPHONE ENCOUNTER
Patient notified.  He states he has tried and failed androgel, testim and depotestosterone.  Resubmitted PA.

## 2023-10-23 RX ORDER — SYRINGE WITH NEEDLE, 1 ML 25GX5/8"
SYRINGE, EMPTY DISPOSABLE MISCELLANEOUS
Qty: 10 EACH | Refills: 3 | Status: SHIPPED | OUTPATIENT
Start: 2023-10-23

## 2023-10-23 RX ORDER — SYRINGE WITH NEEDLE, 1 ML 25GX5/8"
SYRINGE, EMPTY DISPOSABLE MISCELLANEOUS
Qty: 10 EACH | Refills: 3 | Status: SHIPPED | OUTPATIENT
Start: 2023-10-23 | End: 2023-10-23 | Stop reason: SDUPTHER

## 2023-10-23 NOTE — TELEPHONE ENCOUNTER
Rx Refill Note  Requested Prescriptions      No prescriptions requested or ordered in this encounter          Last office visit with prescribing clinician: 9/12/2023     Next office visit with prescribing clinician: 9/16/2024         Yoli Diallo MA  10/23/23, 14:03 EDT

## 2023-10-23 NOTE — TELEPHONE ENCOUNTER
Rx Refill Note  Requested Prescriptions      No prescriptions requested or ordered in this encounter          Last office visit with prescribing clinician: 9/12/2023     Next office visit with prescribing clinician: 9/16/2024         Yoli Diallo MA  10/23/23, 13:25 EDT

## 2024-02-05 DIAGNOSIS — E29.1 PRIMARY MALE HYPOGONADISM: ICD-10-CM

## 2024-02-06 RX ORDER — TESTOSTERONE ENANTHATE 75 MG/.5ML
75 INJECTION SUBCUTANEOUS WEEKLY
Qty: 2 ML | Refills: 5 | Status: SHIPPED | OUTPATIENT
Start: 2024-02-06 | End: 2024-02-08 | Stop reason: SDUPTHER

## 2024-02-06 NOTE — TELEPHONE ENCOUNTER
Rx Refill Note  Requested Prescriptions      No prescriptions requested or ordered in this encounter          Last office visit with prescribing clinician: 9/12/2023     Next office visit with prescribing clinician: 9/16/2024         Yoli Diallo MA  02/06/24, 12:50 EST

## 2024-02-06 NOTE — TELEPHONE ENCOUNTER
Rx Refill Note  Requested Prescriptions     Pending Prescriptions Disp Refills    Testosterone Enanthate (Xyosted) 75 MG/0.5ML solution auto-injector 2 mL 5     Sig: Inject 75 mg under the skin into the appropriate area as directed 1 (One) Time Per Week.          Last office visit with prescribing clinician: 9/12/2023     Next office visit with prescribing clinician: 9/16/2024         Yoli Diallo MA  02/06/24, 08:51 EST

## 2024-02-08 DIAGNOSIS — E29.1 PRIMARY MALE HYPOGONADISM: ICD-10-CM

## 2024-02-08 RX ORDER — TESTOSTERONE ENANTHATE 75 MG/.5ML
75 INJECTION SUBCUTANEOUS WEEKLY
Qty: 2 ML | Refills: 5 | Status: SHIPPED | OUTPATIENT
Start: 2024-02-08

## 2024-02-08 NOTE — TELEPHONE ENCOUNTER
Pharmacy called pt has changed pharmacy and needing a prescription for testosterone enanthate 75 mg. [facility] pharmacy is out of stock prescription sent 02/06/24

## 2024-02-26 ENCOUNTER — PRIOR AUTHORIZATION (OUTPATIENT)
Dept: ENDOCRINOLOGY | Facility: CLINIC | Age: 54
End: 2024-02-26
Payer: COMMERCIAL

## 2024-02-26 RX ORDER — SYRINGE WITH NEEDLE, 1 ML 25GX5/8"
SYRINGE, EMPTY DISPOSABLE MISCELLANEOUS
Qty: 10 EACH | Refills: 3 | Status: SHIPPED | OUTPATIENT
Start: 2024-02-26 | End: 2024-02-28 | Stop reason: SDUPTHER

## 2024-02-26 RX ORDER — SYRINGE WITH NEEDLE, 1 ML 25GX5/8"
SYRINGE, EMPTY DISPOSABLE MISCELLANEOUS
Qty: 9 EACH | Refills: 1 | Status: SHIPPED | OUTPATIENT
Start: 2024-02-26 | End: 2024-02-28 | Stop reason: SDUPTHER

## 2024-02-26 NOTE — TELEPHONE ENCOUNTER
"Rx Refill Note  Requested Prescriptions     Pending Prescriptions Disp Refills    Syringe/Needle, Disp, (B-D 3CC LUER-BRITTANY SYR 80YR6-7/2) 18G X 1-1/2\" 3 ML misc 9 each 1    Syringe/Needle, Disp, (B-D 3CC LUER-BRITTANY SYR 93WS9-5/2) 23G X 1-1/2\" 3 ML misc 10 each 3     Sig: Use to inject testosterone every 10 days          Last office visit with prescribing clinician: 9/12/2023     Next office visit with prescribing clinician: 9/16/2024         Yoli Diallo MA  02/26/24, 16:31 EST   "

## 2024-02-27 DIAGNOSIS — E29.1 PRIMARY MALE HYPOGONADISM: ICD-10-CM

## 2024-02-27 RX ORDER — TESTOSTERONE ENANTHATE 75 MG/.5ML
75 INJECTION SUBCUTANEOUS WEEKLY
Qty: 2 ML | Refills: 5 | OUTPATIENT
Start: 2024-02-27

## 2024-02-27 NOTE — TELEPHONE ENCOUNTER
Jimbo Reynolds (Key: [id number])  PA Case ID #: [id number]  Need Help? Call us at [phone]  Outcome  Approved on February 26  Approved. This drug has been approved under the Member's Medicare Part D benefit for XYOSTED Soln Auto-inj 75MG/0.5ML. Approved quantity: 2 per 28 day(s). You may fill up to a 90 day supply except for those on Specialty Tier 5, which can be filled up to a 30 day supply. Please call the pharmacy to process the prescription claim.  Authorization Expiration Date: 12/31/2099  Drug  Xyosted 75MG/0.5ML auto-injectors  ePA cloud logo  Form  [employer] Electronic Prior Authorization Request Form (2017 [place]CP[place])

## 2024-02-27 NOTE — TELEPHONE ENCOUNTER
PHARMACY IS CALLING TO CHECK STATUS OF PRIOR AUTH FOR TESTOSTERONE PRESCRIPTION. PHONE NUMBER [phone]

## 2024-02-27 NOTE — TELEPHONE ENCOUNTER
Rx Refill Note  Requested Prescriptions     Pending Prescriptions Disp Refills    Testosterone Enanthate (Xyosted) 75 MG/0.5ML solution auto-injector 2 mL 5     Sig: Inject 75 mg under the skin into the appropriate area as directed 1 (One) Time Per Week.          Last office visit with prescribing clinician: 9/12/2023     Next office visit with prescribing clinician: 9/16/2024         Yoli Diallo MA  02/27/24, 13:19 EST

## 2024-02-28 DIAGNOSIS — E29.1 PRIMARY MALE HYPOGONADISM: ICD-10-CM

## 2024-02-28 RX ORDER — SYRINGE WITH NEEDLE, 1 ML 25GX5/8"
SYRINGE, EMPTY DISPOSABLE MISCELLANEOUS
Qty: 10 EACH | Refills: 3 | Status: SHIPPED | OUTPATIENT
Start: 2024-02-28

## 2024-02-28 RX ORDER — TESTOSTERONE CYPIONATE 200 MG/ML
200 INJECTION, SOLUTION INTRAMUSCULAR
Qty: 2 ML | Refills: 5 | Status: SHIPPED | OUTPATIENT
Start: 2024-02-28

## 2024-02-28 RX ORDER — SYRINGE WITH NEEDLE, 1 ML 25GX5/8"
SYRINGE, EMPTY DISPOSABLE MISCELLANEOUS
Qty: 9 EACH | Refills: 1 | Status: SHIPPED | OUTPATIENT
Start: 2024-02-28

## 2024-09-08 DIAGNOSIS — E29.1 PRIMARY MALE HYPOGONADISM: ICD-10-CM

## 2024-09-10 RX ORDER — TESTOSTERONE ENANTHATE 75 MG/.5ML
INJECTION SUBCUTANEOUS
Qty: 2 ML | OUTPATIENT
Start: 2024-09-10

## 2024-09-10 NOTE — TELEPHONE ENCOUNTER
Rx Refill Note  Requested Prescriptions     Pending Prescriptions Disp Refills    Xyosted 75 MG/0.5ML solution auto-injector [Pharmacy Med Name: XYOSTED 75 MG/0.5 ML AUTO-INJ] 2 mL      Sig: INJECT 75 MG UNDER THE SKIN INTO THE APPROPRIATE AREA AS DIRECTED ONCE A WEEK          Last office visit with prescribing clinician: 9/12/2023     Next office visit with prescribing clinician: 9/16/2024         Yoli Diallo MA  09/10/24, 16:33 EDT

## 2024-11-06 DIAGNOSIS — E29.1 PRIMARY MALE HYPOGONADISM: ICD-10-CM

## 2024-11-06 RX ORDER — TESTOSTERONE ENANTHATE 75 MG/.5ML
INJECTION SUBCUTANEOUS
Qty: 2 ML | OUTPATIENT
Start: 2024-11-06

## 2024-11-06 NOTE — TELEPHONE ENCOUNTER
Rx Refill Note  Requested Prescriptions     Pending Prescriptions Disp Refills    Xyosted 75 MG/0.5ML solution auto-injector [Pharmacy Med Name: XYOSTED 75 MG/0.5 ML AUTO-INJ] 2 mL      Sig: INJECT 75 MG UNDER THE SKIN INTO THE APPROPRIATE AREA AS DIRECTED ONCE A WEEK      Last office visit with prescribing clinician: 9/12/2023     Next office visit with prescribing clinician: Visit date not found

## 2025-06-17 ENCOUNTER — HOSPITAL ENCOUNTER (EMERGENCY)
Facility: HOSPITAL | Age: 55
Discharge: LEFT BEFORE DISCHARGE | End: 2025-06-17
Attending: EMERGENCY MEDICINE | Admitting: EMERGENCY MEDICINE
Payer: MEDICARE

## 2025-06-17 VITALS
HEART RATE: 82 BPM | OXYGEN SATURATION: 93 % | TEMPERATURE: 98.8 F | DIASTOLIC BLOOD PRESSURE: 87 MMHG | WEIGHT: 315 LBS | HEIGHT: 72 IN | RESPIRATION RATE: 16 BRPM | BODY MASS INDEX: 42.66 KG/M2 | SYSTOLIC BLOOD PRESSURE: 121 MMHG

## 2025-06-17 DIAGNOSIS — F32.A DEPRESSION, UNSPECIFIED DEPRESSION TYPE: ICD-10-CM

## 2025-06-17 DIAGNOSIS — F19.11 HISTORY OF SUBSTANCE ABUSE: Primary | ICD-10-CM

## 2025-06-17 LAB
ALBUMIN SERPL-MCNC: 3.7 G/DL (ref 3.5–5.2)
ALBUMIN/GLOB SERPL: 1.2 G/DL
ALP SERPL-CCNC: 156 U/L (ref 39–117)
ALT SERPL W P-5'-P-CCNC: 50 U/L (ref 1–41)
AMPHET+METHAMPHET UR QL: NEGATIVE
AMPHETAMINES UR QL: NEGATIVE
ANION GAP SERPL CALCULATED.3IONS-SCNC: 9 MMOL/L (ref 5–15)
APAP SERPL-MCNC: <5 MCG/ML (ref 0–30)
AST SERPL-CCNC: 32 U/L (ref 1–40)
BARBITURATES UR QL SCN: POSITIVE
BASOPHILS # BLD AUTO: 0.07 10*3/MM3 (ref 0–0.2)
BASOPHILS NFR BLD AUTO: 0.7 % (ref 0–1.5)
BENZODIAZ UR QL SCN: POSITIVE
BILIRUB SERPL-MCNC: 0.5 MG/DL (ref 0–1.2)
BUN SERPL-MCNC: 15.7 MG/DL (ref 6–20)
BUN/CREAT SERPL: 19.1 (ref 7–25)
BUPRENORPHINE SERPL-MCNC: POSITIVE NG/ML
CALCIUM SPEC-SCNC: 8.6 MG/DL (ref 8.6–10.5)
CANNABINOIDS SERPL QL: POSITIVE
CHLORIDE SERPL-SCNC: 104 MMOL/L (ref 98–107)
CO2 SERPL-SCNC: 27 MMOL/L (ref 22–29)
COCAINE UR QL: NEGATIVE
CREAT SERPL-MCNC: 0.82 MG/DL (ref 0.76–1.27)
DEPRECATED RDW RBC AUTO: 47.8 FL (ref 37–54)
EGFRCR SERPLBLD CKD-EPI 2021: 105.7 ML/MIN/1.73
EOSINOPHIL # BLD AUTO: 0.1 10*3/MM3 (ref 0–0.4)
EOSINOPHIL NFR BLD AUTO: 1.1 % (ref 0.3–6.2)
ERYTHROCYTE [DISTWIDTH] IN BLOOD BY AUTOMATED COUNT: 14.7 % (ref 12.3–15.4)
ETHANOL BLD-MCNC: <10 MG/DL (ref 0–10)
FENTANYL UR-MCNC: NEGATIVE NG/ML
GLOBULIN UR ELPH-MCNC: 3 GM/DL
GLUCOSE SERPL-MCNC: 108 MG/DL (ref 65–99)
HCT VFR BLD AUTO: 35.7 % (ref 37.5–51)
HGB BLD-MCNC: 11.9 G/DL (ref 13–17.7)
HOLD SPECIMEN: NORMAL
IMM GRANULOCYTES # BLD AUTO: 0.38 10*3/MM3 (ref 0–0.05)
IMM GRANULOCYTES NFR BLD AUTO: 4 % (ref 0–0.5)
LYMPHOCYTES # BLD AUTO: 1.66 10*3/MM3 (ref 0.7–3.1)
LYMPHOCYTES NFR BLD AUTO: 17.5 % (ref 19.6–45.3)
MCH RBC QN AUTO: 29.5 PG (ref 26.6–33)
MCHC RBC AUTO-ENTMCNC: 33.3 G/DL (ref 31.5–35.7)
MCV RBC AUTO: 88.4 FL (ref 79–97)
METHADONE UR QL SCN: NEGATIVE
MONOCYTES # BLD AUTO: 0.74 10*3/MM3 (ref 0.1–0.9)
MONOCYTES NFR BLD AUTO: 7.8 % (ref 5–12)
NEUTROPHILS NFR BLD AUTO: 6.54 10*3/MM3 (ref 1.7–7)
NEUTROPHILS NFR BLD AUTO: 68.9 % (ref 42.7–76)
NRBC BLD AUTO-RTO: 0 /100 WBC (ref 0–0.2)
OPIATES UR QL: NEGATIVE
OXYCODONE UR QL SCN: NEGATIVE
PCP UR QL SCN: NEGATIVE
PLATELET # BLD AUTO: 261 10*3/MM3 (ref 140–450)
PMV BLD AUTO: 10.6 FL (ref 6–12)
POTASSIUM SERPL-SCNC: 4.2 MMOL/L (ref 3.5–5.2)
PROT SERPL-MCNC: 6.7 G/DL (ref 6–8.5)
QT INTERVAL: 342 MS
QTC INTERVAL: 429 MS
RBC # BLD AUTO: 4.04 10*6/MM3 (ref 4.14–5.8)
SALICYLATES SERPL-MCNC: <0.3 MG/DL
SODIUM SERPL-SCNC: 140 MMOL/L (ref 136–145)
TRICYCLICS UR QL SCN: NEGATIVE
TSH SERPL DL<=0.05 MIU/L-ACNC: 0.57 UIU/ML (ref 0.27–4.2)
WBC NRBC COR # BLD AUTO: 9.49 10*3/MM3 (ref 3.4–10.8)
WHOLE BLOOD HOLD COAG: NORMAL
WHOLE BLOOD HOLD SPECIMEN: NORMAL

## 2025-06-17 PROCEDURE — 82077 ASSAY SPEC XCP UR&BREATH IA: CPT | Performed by: EMERGENCY MEDICINE

## 2025-06-17 PROCEDURE — 93005 ELECTROCARDIOGRAM TRACING: CPT | Performed by: EMERGENCY MEDICINE

## 2025-06-17 PROCEDURE — 80053 COMPREHEN METABOLIC PANEL: CPT | Performed by: EMERGENCY MEDICINE

## 2025-06-17 PROCEDURE — 99284 EMERGENCY DEPT VISIT MOD MDM: CPT

## 2025-06-17 PROCEDURE — 80179 DRUG ASSAY SALICYLATE: CPT | Performed by: EMERGENCY MEDICINE

## 2025-06-17 PROCEDURE — 84443 ASSAY THYROID STIM HORMONE: CPT | Performed by: EMERGENCY MEDICINE

## 2025-06-17 PROCEDURE — 80307 DRUG TEST PRSMV CHEM ANLYZR: CPT | Performed by: EMERGENCY MEDICINE

## 2025-06-17 PROCEDURE — 25810000003 SODIUM CHLORIDE 0.9 % SOLUTION: Performed by: EMERGENCY MEDICINE

## 2025-06-17 PROCEDURE — 85025 COMPLETE CBC W/AUTO DIFF WBC: CPT | Performed by: EMERGENCY MEDICINE

## 2025-06-17 PROCEDURE — 80143 DRUG ASSAY ACETAMINOPHEN: CPT | Performed by: EMERGENCY MEDICINE

## 2025-06-17 RX ORDER — SODIUM CHLORIDE 0.9 % (FLUSH) 0.9 %
10 SYRINGE (ML) INJECTION AS NEEDED
Status: DISCONTINUED | OUTPATIENT
Start: 2025-06-17 | End: 2025-06-17 | Stop reason: HOSPADM

## 2025-06-17 RX ADMIN — SODIUM CHLORIDE 1000 ML: 9 INJECTION, SOLUTION INTRAVENOUS at 08:50

## 2025-06-17 NOTE — ED PROVIDER NOTES
[facility]    EMERGENCY DEPARTMENT ENCOUNTER      Pt Name: Jimbo Reynolds  MRN: [id number]  YOB: 1970  Date of evaluation: 6/17/2025  Provider: Fernando Guajardo DO    CHIEF COMPLAINT       Chief Complaint   Patient presents with    Addiction Problem     HPI  Stated Reason for Visit: pt arrived via EMS from home; inital complaint was fever and then reported that he was detoxing from Methadone per EMS; pt reports that he is here for alcholol detoxHistory Obtained From: patient;EMS     HISTORY OF PRESENT ILLNESS  (Location/Symptom, Timing/Onset, Context/Setting, Quality, Duration, Modifying Factors, Severity.)   Jimbo Reynolds is a 54 y.o. male who presents to the emergency department via EMS for multiple complaints.  EMS notes the recall potentially for patient complaining of fever but when they got there the patient states that he had recently undergone rehab and therapy in [facility] and was using alcohol, methamphetamines, went through detoxification as an inpatient and was discharged a day or 2 ago.  He notes he is not started drinking again or using drugs but he had a lot of his psychiatric medications changed and states she just feels off, he feels anxious, intermittent anger, really just does not feel well in general.  He does have a family member or friend who lives with him with his dogs.  Patient states he is very frustrated as he does not feel well, states he is very well-connected in the rehab facilities throughout the region.  States he is very depressed, anxious, denies SI but states he is definitely feeling depressed.      Nursing notes were reviewed.      PAST MEDICAL HISTORY     Past Medical History:   Diagnosis Date    Abnormal weight gain     Anxiety     COPD (chronic obstructive pulmonary disease)     Depression     Gallbladder abscess     HIV (human immunodeficiency virus infection) 1997    for 22yrs    Hyperprolactinemia     Hypertension     Male hypogonadism      "Testosterone deficiency 2000         SURGICAL HISTORY       Past Surgical History:   Procedure Laterality Date    CHOLECYSTECTOMY           CURRENT MEDICATIONS       Current Facility-Administered Medications:     sodium chloride 0.9 % flush 10 mL, 10 mL, Intravenous, PRN, Fernando Guajardo,     sodium chloride 0.9 % flush 10 mL, 10 mL, Intravenous, PRN, Fernando Guajardo, DO    Current Outpatient Medications:     albuterol sulfate  (90 Base) MCG/ACT inhaler, , Disp: , Rfl:     betamethasone dipropionate 0.05 % cream, , Disp: , Rfl:     buprenorphine-naloxone (SUBOXONE) 8-2 MG per SL tablet, Place 1 tablet under the tongue Daily., Disp: , Rfl:     darunavir ethanolate (PREZISTA) 800 MG tablet tablet, Take 1 tablet by mouth Daily With Breakfast., Disp: , Rfl:     desvenlafaxine (PRISTIQ) 100 MG 24 hr tablet, , Disp: , Rfl:     Desvenlafaxine Fumarate  MG tablet sustained-release 24 hour, Take 100 mg by mouth Daily., Disp: , Rfl:     Wlqemwp-Gibzp-Artoxhhy-TenofAF (GENVOYA) 826-630-561-10 MG per tablet, 1 tablet., Disp: , Rfl:     Needle, Disp, (BD Hypodermic Needle) 18G X 1\" misc, Use to draw up testosterone every 10 days, Disp: 10 each, Rfl: 3    pregabalin (LYRICA) 150 MG capsule, , Disp: , Rfl:     Syringe/Needle, Disp, (B-D 3CC LUER-BRITTANY SYR 10WK0-5/2) 18G X 1-1/2\" 3 ML misc, USE TO INJECT MEDICATION ONCE EVERY 10 DAYS, Disp: 9 each, Rfl: 1    Syringe/Needle, Disp, (B-D 3CC LUER-BRITTANY SYR 35KI2-5/2) 23G X 1-1/2\" 3 ML misc, Use to inject testosterone every 10 days, Disp: 10 each, Rfl: 3    Testosterone Cypionate (DEPOTESTOTERONE CYPIONATE) 200 MG/ML injection, Inject 1 mL into the appropriate muscle as directed by prescriber Every 14 (Fourteen) Days., Disp: 2 mL, Rfl: 5    tretinoin (RETIN-A) 0.1 % cream, , Disp: , Rfl:     ALLERGIES     Atovaquone, Meloxicam, Sulfa antibiotics, and Tramadol    FAMILY HISTORY       Family History   Problem Relation Age of Onset    Cancer Mother     Heart disease " Mother     Lung disease Mother     Cancer Father     Heart disease Father     Lung disease Father     Lung disease Sister           SOCIAL HISTORY       Social History     Socioeconomic History    Marital status: Single   Tobacco Use    Smoking status: Former     Current packs/day: 0.00     Types: Cigarettes     Quit date: 6/3/2012     Years since quittin.0    Smokeless tobacco: Never   Vaping Use    Vaping status: Never Used   Substance and Sexual Activity    Alcohol use: No    Drug use: No    Sexual activity: Defer         PHYSICAL EXAM       Vitals:    25 1100 25 1103 25 1106 25 1130   BP:   121/87    Pulse: 82  86 82   Resp:       Temp:       TempSrc:       SpO2: (!) 89% 91%  93%   Weight:       Height:           Physical Exam  General : Patient is awake, alert, oriented, tearful, pressured speech during my assessment  HEENT: Pupils are equally round, EOMI, conjunctivae clear  Neck: Neck is supple, full range of motion, trachea midline  Cardiac: Heart regular rate, rhythm, no murmurs, rubs, or gallops  Lungs: Lungs decreased breath sounds bilaterally, no acute respiratory distress  Chest wall: There is no tenderness to palpation over the chest wall or over ribs  Abdomen: Abdomen is soft, nontender, nondistended. There are no firm or pulsatile masses, no rebound rigidity or guarding  Musculoskeletal: Venous stasis dermatitis bilateral lower extremities  Neuro: Motor intact, sensory intact, level of consciousness is normal  Dermatology: Skin is warm and dry  Psych: Mentation is grossly normal, cognition is grossly normal. Affect is anxious, tearful      DIAGNOSTIC RESULTS     EKG:  All EKGs are interpreted by the Emergency Department Physician who either signs or Co-signs this chart in the absence of a cardiologist.    ECG 12 Lead Chest Pain   Final Result   Test Reason : Chest Pain   Blood Pressure :   */*   mmHG   Vent. Rate :  95 BPM     Atrial Rate :  95 BPM      P-R Int : 184 ms           QRS Dur : 100 ms       QT Int : 342 ms       P-R-T Axes :  48  38  38 degrees     QTcB Int : 429 ms      Normal sinus rhythm   motion artifact   No previous ECGs available   Confirmed by GOLD DROADO MD ([id number]) on 6/17/2025 8:15:00 AM      Referred By: bela valentine           Confirmed By: GOLD DORADO MD                LABS:    I have reviewed and interpreted all of the currently available lab results from this visit (if applicable):  Results for orders placed or performed during the hospital encounter of 06/17/25   Comprehensive Metabolic Panel    Collection Time: 06/17/25  7:47 AM    Specimen: Blood   Result Value Ref Range    Glucose 108 (H) 65 - 99 mg/dL    BUN 15.7 6.0 - 20.0 mg/dL    Creatinine 0.82 0.76 - 1.27 mg/dL    Sodium 140 136 - 145 mmol/L    Potassium 4.2 3.5 - 5.2 mmol/L    Chloride 104 98 - 107 mmol/L    CO2 27.0 22.0 - 29.0 mmol/L    Calcium 8.6 8.6 - 10.5 mg/dL    Total Protein 6.7 6.0 - 8.5 g/dL    Albumin 3.7 3.5 - 5.2 g/dL    ALT (SGPT) 50 (H) 1 - 41 U/L    AST (SGOT) 32 1 - 40 U/L    Alkaline Phosphatase 156 (H) 39 - 117 U/L    Total Bilirubin 0.5 0.0 - 1.2 mg/dL    Globulin 3.0 gm/dL    A/G Ratio 1.2 g/dL    BUN/Creatinine Ratio 19.1 7.0 - 25.0    Anion Gap 9.0 5.0 - 15.0 mmol/L    eGFR 105.7 >60.0 mL/min/1.73   Acetaminophen Level    Collection Time: 06/17/25  7:47 AM    Specimen: Blood   Result Value Ref Range    Acetaminophen <5.0 0.0 - 30.0 mcg/mL   Ethanol    Collection Time: 06/17/25  7:47 AM    Specimen: Blood   Result Value Ref Range    Ethanol <10 0 - 10 mg/dL   Salicylate Level    Collection Time: 06/17/25  7:47 AM    Specimen: Blood   Result Value Ref Range    Salicylate <0.3 <=30.0 mg/dL   TSH Rfx On Abnormal To Free T4    Collection Time: 06/17/25  7:47 AM    Specimen: Blood   Result Value Ref Range    TSH 0.568 0.270 - 4.200 uIU/mL   CBC Auto Differential    Collection Time: 06/17/25  7:47 AM    Specimen: Blood   Result Value Ref Range    WBC 9.49 3.40 - 10.80 10*3/mm3     RBC 4.04 (L) 4.14 - 5.80 10*6/mm3    Hemoglobin 11.9 (L) 13.0 - 17.7 g/dL    Hematocrit 35.7 (L) 37.5 - 51.0 %    MCV 88.4 79.0 - 97.0 fL    MCH 29.5 26.6 - 33.0 pg    MCHC 33.3 31.5 - 35.7 g/dL    RDW 14.7 12.3 - 15.4 %    RDW-SD 47.8 37.0 - 54.0 fl    MPV 10.6 6.0 - 12.0 fL    Platelets 261 140 - 450 10*3/mm3    Neutrophil % 68.9 42.7 - 76.0 %    Lymphocyte % 17.5 (L) 19.6 - 45.3 %    Monocyte % 7.8 5.0 - 12.0 %    Eosinophil % 1.1 0.3 - 6.2 %    Basophil % 0.7 0.0 - 1.5 %    Immature Grans % 4.0 (H) 0.0 - 0.5 %    Neutrophils, Absolute 6.54 1.70 - 7.00 10*3/mm3    Lymphocytes, Absolute 1.66 0.70 - 3.10 10*3/mm3    Monocytes, Absolute 0.74 0.10 - 0.90 10*3/mm3    Eosinophils, Absolute 0.10 0.00 - 0.40 10*3/mm3    Basophils, Absolute 0.07 0.00 - 0.20 10*3/mm3    Immature Grans, Absolute 0.38 (H) 0.00 - 0.05 10*3/mm3    nRBC 0.0 0.0 - 0.2 /100 WBC   Green Top (Gel)    Collection Time: 06/17/25  7:47 AM   Result Value Ref Range    Extra Tube Hold for add-ons.    Lavender Top    Collection Time: 06/17/25  7:47 AM   Result Value Ref Range    Extra Tube hold for add-on    Gold Top - SST    Collection Time: 06/17/25  7:47 AM   Result Value Ref Range    Extra Tube Hold for add-ons.    Gray Top    Collection Time: 06/17/25  7:47 AM   Result Value Ref Range    Extra Tube Hold for add-ons.    Light Blue Top    Collection Time: 06/17/25  7:47 AM   Result Value Ref Range    Extra Tube Hold for add-ons.    ECG 12 Lead Chest Pain    Collection Time: 06/17/25  8:13 AM   Result Value Ref Range    QT Interval 342 ms    QTC Interval 429 ms   Urine Drug Screen - Urine, Clean Catch    Collection Time: 06/17/25 11:02 AM    Specimen: Urine, Clean Catch   Result Value Ref Range    THC, Screen, Urine Positive (A) Negative    Phencyclidine (PCP), Urine Negative Negative    Cocaine Screen, Urine Negative Negative    Methamphetamine, Ur Negative Negative    Opiate Screen Negative Negative    Amphetamine Screen, Urine Negative Negative     Benzodiazepine Screen, Urine Positive (A) Negative    Tricyclic Antidepressants Screen Negative Negative    Methadone Screen, Urine Negative Negative    Barbiturates Screen, Urine Positive (A) Negative    Oxycodone Screen, Urine Negative Negative    Buprenorphine, Screen, Urine Positive (A) Negative   Fentanyl, Urine - Urine, Clean Catch    Collection Time: 06/17/25 11:02 AM    Specimen: Urine, Clean Catch   Result Value Ref Range    Fentanyl, Urine Negative Negative        If labs were ordered, I independently reviewed the results and considered them in treating the patient.      EMERGENCY DEPARTMENT COURSE and DIFFERENTIAL DIAGNOSIS/MDM:   Vitals:  AS OF 13:05 EDT    BP - 121/87  HR - 82  TEMP - 98.8 °F (37.1 °C) (Oral)  O2 SATS - 93%      Orders placed during this visit:  Orders Placed This Encounter   Procedures    Simpson Draw    Comprehensive Metabolic Panel    Acetaminophen Level    Ethanol    Salicylate Level    Urine Drug Screen - Urine, Clean Catch    TSH Rfx On Abnormal To Free T4    CBC Auto Differential    Fentanyl, Urine - Urine, Clean Catch    NPO Diet NPO Type: Strict NPO    Vital Signs    Continuous Pulse Oximetry    Psych / Access to See    Psych / Access to See    Oxygen Therapy- Nasal Cannula; Titrate 1-6 LPM Per SpO2; 90 - 95%    POC Glucose Once    ECG 12 Lead Chest Pain    Insert peripheral IV    Insert Peripheral IV    Green Top (Gel)    Lavender Top    Gold Top - SST    Gray Top    Light Blue Top    CBC & Differential       All labs have been independently reviewed by me.  All radiology studies have been reviewed by me and the radiologist dictating the report.  All EKG's have been independently viewed and interpreted by me.      Discussion below represents my analysis of pertinent findings related to patient's condition, differential diagnosis, treatment plan and final disposition.    Differential diagnosis:  The differential diagnosis associated with the patient's presentation includes:  Psychiatric illness, depression, anxiety, medication adjustments, polysubstance abuse    Additional sources  Discussed/ obtained information from independent historians:   [] Spouse  [] Parent  [] Family member  [] Friend  [x] EMS   [] Other:    External (non-ED) record review:   [x] Inpatient record:     Healthcare  Outside Information  Reba Zamarripa MD  Physician  Hospital Medicine     Discharge Summary     Addendum     Date of Service: 06/14/25 0959  Creation Time: 06/14/25 0959  Note Received: 06/17/25 0801           Hospitalization  Admit Date/Time: 6/1/2025  9:58 AM  Admitting Attending: Artur Doherty  Discharge Date: 6/14/2025  Discharge Attending Physician: Reba Zamarripa MD   PCP name and Address:  Jose Marie PA [address] [address]  Referring provider name and address:   No referring provider defined for this encounter.     Chief Concern, Brief History of Present Illness, and Hospital Course  Mr. Reynolds is a 54-year-old male with a medical history relevant for methamphetamine use disorder, opioid use disorder, alcohol use disorder, presented to the emergency department 6/1 in hopes of alcohol detoxification.  Patient states that he recently left [facility] as he felt that they were unable to provide help for him.  Patient stated that he last consumed proximally 5-6 airplane bottles the morning of admission (6/1), and stated that he had been out of his Suboxone since Thursday 5/29. He is prescribed 16mg daily. He typically consumes 1-2 50ml vodka shooters hourly, for total of 1L daily average.  Patient denied a history of alcohol withdrawal seizures initially but then endorsed that he was unsure if he has had seizures or not. MICU was consulted for admission due to high CIWA scoring in ED >40 with frequent ativan administration, s/p phenobarbital x1. To note, he is also HIV+ and adherent to treatment, last CD4 01/2025 was 585.  Pt was admitted to MICU for closer monitoring. Throughout his MICU stay pt struggled with agitation requiring intermittent sedation with precedex gtt. Pt began having lower CIWA scores and intermittent agitation. On 6/10 he had remained off of sedation (precedex gtt) x 24hrs and remained calm and redirectable and appropriate for downgrade to acute level of care 6/10. ACES and Psychiatry were consulted to optimize meds while on  service.  Patient is medically stable for discharge today.      Discharge Diagnosis:  Acute Alcohol withdrawal - resolved  AUD- alcohol/opioid  ACES consulted, started on acamprosate  Received sublocade IM 6/13.     Headache- nonspecific  Cough- resolved     Mood disorder  ADHD  Anxiety  H/o seizures: Last seizures in 2013  Medications optimized by Psychiatry.   Encouraged to [facility] behavioral [facility].      HIV  Continue Genvoya, prezista     Morbid Obesity         [] Office record:   [] Outpatient record:   [] Prior Outpatient labs:   [] Prior Outpatient radiology:   [] Primary Care record:   [] Outside ED record:   [] Other:     Patient's care impacted by:   [] Diabetes  [] Hypertension  [] CHF  [] Hyperlipidemia  [] Coronary Artery Disease   [] COPD   [] Cancer   [] Tobacco Abuse   [x] Substance Abuse    [] Other:     Care significantly affected by Social Determinants of Health (housing and economic circumstances, unemployment)    [] Yes     [x] No   If yes, Patient's care significantly limited by Social Determinants of Health including:   [] Inadequate housing   [] Low income   [] Alcoholism and drug addiction in family   [] Problems related to primary support group   [] Unemployment   [] Problems related to employment   [] Other Social Determinants of Health:       MEDICATIONS ADMINISTERED IN ED:  Medications   sodium chloride 0.9 % flush 10 mL (has no administration in time range)   sodium chloride 0.9 % flush 10 mL (has no administration in time range)   sodium  chloride 0.9 % bolus 1,000 mL (0 mL Intravenous Stopped 6/17/25 4779)              This is a 54-year-old male with a history of polysubstance abuse, alcohol use who just underwent inpatient hospitalization on June 1 through June 14 at [facility] for polysubstance abuse, alcohol usage, underwent treatment requiring sedation, Precedex for agitation, psychiatric assessments with medication adjustments.  Presents today as he just really has not felt well since he went home a couple days ago.  He is adamant he has not use drugs again since being discharged or drank a sip of alcohol.  He certainly depressed, anxious and agitated during my assessment but he is nontoxic-appearing.  Patient's labs are unremarkable, CBC white count of 9.5, stable hemoglobin, kidney function liver function are stable, negative ethanol Tylenol levels, he does have multiple abnormalities on his urine drug screen.  Patient was given IV fluids, and he stated that he really did not want to stay in the hospital, wants to go home he has good resources as an outpatient, can go back with his partner Abram.  We did have assessment by our addiction team who agrees he does have plenty of resources in outpatient and can go home as he just went through medical detoxification a couple days ago.  Patient called his ride, had him come pick him up, left prior to formally being discharged from the hospital by myself.  Patient left AMA.      PROCEDURES:  Procedures    CRITICAL CARE TIME    Total Critical Care time was 0 minutes, excluding separately reportable procedures.   There was a high probability of clinically significant/life threatening deterioration in the patient's condition which required my urgent intervention.      FINAL IMPRESSION      1. History of substance abuse    2. Depression, unspecified depression type          DISPOSITION/PLAN     ED Disposition       ED Disposition   AMA    Condition   --    Comment   --               PATIENT  "REFERRED TO:  PATIENT CONNECTION - [facility] [address]  [phone]  Schedule an appointment as soon as possible for a visit in 2 days      [facility]  [address]  [facility] [address] [phone]  Schedule an appointment as soon as possible for a visit       [facility] EMERGENCY DEPARTMENT  [address]  [facility] [address] [phone]    If symptoms worsen      DISCHARGE MEDICATIONS:     Medication List        CONTINUE taking these medications      albuterol sulfate  (90 Base) MCG/ACT inhaler  Commonly known as: PROVENTIL HFA;VENTOLIN HFA;PROAIR HFA     * B-D 3CC LUER-BRITTANY SYR 71IL4-8/2 18G X 1-1/2\" 3 ML misc  Generic drug: Syringe/Needle (Disp)  USE TO INJECT MEDICATION ONCE EVERY 10 DAYS     * B-D 3CC LUER-BRITTANY SYR 71XS6-8/2 23G X 1-1/2\" 3 ML misc  Generic drug: Syringe/Needle (Disp)  Use to inject testosterone every 10 days     BD Hypodermic Needle 18G X 1\" misc  Generic drug: Needle (Disp)  Use to draw up testosterone every 10 days     betamethasone dipropionate 0.05 % cream  Commonly known as: DIPROSONE     buprenorphine-naloxone 8-2 MG per SL tablet  Commonly known as: SUBOXONE     darunavir ethanolate 800 MG tablet tablet  Commonly known as: PREZISTA     desvenlafaxine 100 MG 24 hr tablet  Commonly known as: PRISTIQ     Desvenlafaxine Fumarate  MG tablet sustained-release 24 hour     Comhjpe-Matls-Wobnxjyi-TenofAF 425-384-457-10 MG per tablet  Commonly known as: GENVOYA     pregabalin 150 MG capsule  Commonly known as: LYRICA     Testosterone Cypionate 200 MG/ML injection  Commonly known as: DEPOTESTOTERONE CYPIONATE  Inject 1 mL into the appropriate muscle as directed by prescriber Every 14 (Fourteen) Days.     tretinoin 0.1 % cream  Commonly known as: RETIN-A           * This list has 2 medication(s) that are the same as other medications prescribed for you. Read the directions carefully, and ask your doctor or " other care provider to review them with you.                      Comment: Please note this report has been produced using speech recognition software.      Fernando Guajardo DO  Attending Emergency Physician         Fernando Guajardo DO  06/17/25 8504

## 2025-06-17 NOTE — CASE MANAGEMENT/SOCIAL WORK
"Continued Stay Note  [facility]     Patient Name: Jimbo Reynolds  MRN: [id number]  Today's Date: 6/17/2025    Admit Date: 6/17/2025    Plan: Declined All AUD/KATELYN Resources   Discharge Plan       Row Name 06/17/25 1216       Plan    Plan Declined All AUD/KATELYN Resources    Plan Comments Spoke to patient at bedside. Patient stated that he was leaving AMA. He said that he was here for alcohol detox. However, it should be noted that he has spent the past two weeks in the hospital and , where he left AMA yesterday. Patient denies drinking anything last night. I asked the patient about methadone and he said that he is not on methadone, he is on suboxone. He receives his suboxone through the [facility] with . Patient stated that he is seen there two to three times a week. Patient was not interested in AUD/KATELYN resources. He stated, \"I already have all of them\". Patient again stated that he was just waiting on his ride. Updated Dr. Guajardo on patient's status.                   Discharge Codes    No documentation.                       Veronika Beasley    Addiction Treatment Navigator  Ext. [phone]  "